# Patient Record
Sex: MALE | Race: WHITE | NOT HISPANIC OR LATINO | URBAN - METROPOLITAN AREA
[De-identification: names, ages, dates, MRNs, and addresses within clinical notes are randomized per-mention and may not be internally consistent; named-entity substitution may affect disease eponyms.]

---

## 2023-09-14 PROBLEM — E78.5 HYPERLIPIDEMIA: Status: ACTIVE | Noted: 2023-09-14

## 2023-09-14 PROBLEM — H93.19 TINNITUS: Status: ACTIVE | Noted: 2023-09-14

## 2023-09-14 PROBLEM — E66.9 OBESITY (BMI 30.0-34.9): Status: ACTIVE | Noted: 2023-09-14

## 2023-09-14 PROBLEM — I48.0 PAROXYSMAL ATRIAL FIBRILLATION WITH RVR (MULTI): Status: ACTIVE | Noted: 2023-09-14

## 2023-09-14 PROBLEM — B02.29 POSTHERPETIC NEURALGIA: Status: ACTIVE | Noted: 2023-09-14

## 2023-09-14 PROBLEM — I49.9 VENTRICULAR ARRHYTHMIA: Status: ACTIVE | Noted: 2023-09-14

## 2023-09-14 PROBLEM — H81.09 MENIERE DISEASE: Status: ACTIVE | Noted: 2023-09-14

## 2023-09-14 PROBLEM — E66.811 OBESITY (BMI 30.0-34.9): Status: ACTIVE | Noted: 2023-09-14

## 2023-09-14 PROBLEM — E55.9 VITAMIN D DEFICIENCY: Status: ACTIVE | Noted: 2023-09-14

## 2023-09-14 RX ORDER — TAMSULOSIN HYDROCHLORIDE 0.4 MG/1
1 CAPSULE ORAL DAILY
COMMUNITY
Start: 2021-05-18

## 2023-09-14 RX ORDER — SIMVASTATIN 40 MG/1
40 TABLET, FILM COATED ORAL EVERY EVENING
COMMUNITY
Start: 2022-04-19 | End: 2023-10-17 | Stop reason: SDUPTHER

## 2023-09-14 RX ORDER — CHOLECALCIFEROL (VITAMIN D3) 50 MCG
100 TABLET ORAL DAILY
COMMUNITY

## 2023-09-14 RX ORDER — METOPROLOL SUCCINATE 25 MG/1
1 TABLET, EXTENDED RELEASE ORAL 2 TIMES DAILY
COMMUNITY
End: 2023-10-17 | Stop reason: SDUPTHER

## 2023-09-14 RX ORDER — ASPIRIN 81 MG/1
81 TABLET ORAL DAILY
COMMUNITY

## 2023-09-14 RX ORDER — MULTIVIT-MIN/FA/LYCOPEN/LUTEIN .4-300-25
1 TABLET ORAL DAILY
COMMUNITY

## 2023-10-17 ENCOUNTER — OFFICE VISIT (OUTPATIENT)
Dept: PRIMARY CARE | Facility: CLINIC | Age: 58
End: 2023-10-17
Payer: COMMERCIAL

## 2023-10-17 VITALS
HEIGHT: 73 IN | SYSTOLIC BLOOD PRESSURE: 128 MMHG | BODY MASS INDEX: 33.66 KG/M2 | TEMPERATURE: 98.2 F | DIASTOLIC BLOOD PRESSURE: 80 MMHG | WEIGHT: 254 LBS | HEART RATE: 65 BPM

## 2023-10-17 DIAGNOSIS — Z12.5 SCREENING PSA (PROSTATE SPECIFIC ANTIGEN): ICD-10-CM

## 2023-10-17 DIAGNOSIS — E55.9 VITAMIN D DEFICIENCY: ICD-10-CM

## 2023-10-17 DIAGNOSIS — I48.0 PAF (PAROXYSMAL ATRIAL FIBRILLATION) (MULTI): ICD-10-CM

## 2023-10-17 DIAGNOSIS — E78.5 HYPERLIPIDEMIA, UNSPECIFIED HYPERLIPIDEMIA TYPE: Primary | ICD-10-CM

## 2023-10-17 DIAGNOSIS — N40.0 BENIGN PROSTATIC HYPERPLASIA WITHOUT LOWER URINARY TRACT SYMPTOMS: ICD-10-CM

## 2023-10-17 PROCEDURE — 99214 OFFICE O/P EST MOD 30 MIN: CPT | Performed by: INTERNAL MEDICINE

## 2023-10-17 PROCEDURE — 3008F BODY MASS INDEX DOCD: CPT | Performed by: INTERNAL MEDICINE

## 2023-10-17 PROCEDURE — 1036F TOBACCO NON-USER: CPT | Performed by: INTERNAL MEDICINE

## 2023-10-17 RX ORDER — SIMVASTATIN 40 MG/1
40 TABLET, FILM COATED ORAL EVERY EVENING
Qty: 90 TABLET | Refills: 1 | Status: SHIPPED | OUTPATIENT
Start: 2023-10-17 | End: 2024-04-15 | Stop reason: SDUPTHER

## 2023-10-17 RX ORDER — METOPROLOL SUCCINATE 25 MG/1
25 TABLET, EXTENDED RELEASE ORAL 2 TIMES DAILY
Qty: 180 TABLET | Refills: 1 | Status: SHIPPED | OUTPATIENT
Start: 2023-10-17 | End: 2024-04-15 | Stop reason: SDUPTHER

## 2023-10-17 ASSESSMENT — PATIENT HEALTH QUESTIONNAIRE - PHQ9
1. LITTLE INTEREST OR PLEASURE IN DOING THINGS: NOT AT ALL
SUM OF ALL RESPONSES TO PHQ9 QUESTIONS 1 & 2: 0
2. FEELING DOWN, DEPRESSED OR HOPELESS: NOT AT ALL

## 2023-10-17 ASSESSMENT — LIFESTYLE VARIABLES
HOW OFTEN DO YOU HAVE A DRINK CONTAINING ALCOHOL: NEVER
SKIP TO QUESTIONS 9-10: 1
AUDIT-C TOTAL SCORE: 0
HOW OFTEN DO YOU HAVE SIX OR MORE DRINKS ON ONE OCCASION: NEVER
HOW MANY STANDARD DRINKS CONTAINING ALCOHOL DO YOU HAVE ON A TYPICAL DAY: PATIENT DOES NOT DRINK

## 2023-10-17 ASSESSMENT — ENCOUNTER SYMPTOMS
DEPRESSION: 0
OCCASIONAL FEELINGS OF UNSTEADINESS: 0
LOSS OF SENSATION IN FEET: 0

## 2023-10-17 NOTE — PROGRESS NOTES
Patient is here For follow-up of his medical problems including hyperlipidemia, vitamin D deficiency, paroxysmal atrial fibrillation, obesity and other medical problems.  He also needs a refill on his prescriptions.  He denies any fever chill anorexia.  Has no headache or visual disturbances.  Has no chest pain or palpitation.  Has no respiratory symptoms.  Denies any nausea matting pain abdomen.  He has no urinary symptoms.  He is being followed by his urologist Dr. Guajardo for his prostate enlargement.  Denies any weakness of any extremity.  He gets occasional backache.  Review of other systems are negative.    On examination:  General examination: Overweight.  His BMI is 33.5 today  Eyes: There is no pallor jaundice pupils are equal and reactive to light  Oral cavity throat normal  Neck: There is no thyroid alignment, JVD or carotid bruit  Lungs: Clear to auscultation  CVS: Heart sounds are regular there is no gallop murmur  Abdomen: There is no megaly tenderness  CNS: Power is normal  Musculoskeletal system there is no joint swelling or deformity  Legs: No edema  Skin: No rash    Assessment and plan:  1.  Hyperlipidemia: Prescription for simvastatin is renewed.  Repeat lipid panel is ordered  2.  Vitamin D deficiency: Advised to continue with the current supplement.  Repeat vitamin D level is ordered  3.  Paroxysmal atrial fibrillation: Patient is clinically in sinus rhythm.  His prescription for metoprolol is renewed.  Patient is advised to make a follow-up appointment with his cardiologist .  4.  Benign prostatic hypertrophy: Patient has no urinary symptoms.  He is on Flomax  5.  Obesity: Educated about cardiovascular risk factors.  Encouraged to cut down calorie intake and exercise regularly  6.  Health maintenance: Patient had a Cologuard test done in January 2021.  We will have a repeat Cologuard test done next year.  His PSA is being followed by his urologist Dr. Guajardo.  Patient declined a flu  vaccination today.  Recommendations regarding booster dose for COVID-19 vaccine is made to the patient  He will be seen the office in 6 months, earlier if needed

## 2024-03-22 PROBLEM — S62.609A FINGER FRACTURE: Status: ACTIVE | Noted: 2023-08-18

## 2024-03-22 PROBLEM — S61.219A FINGER LACERATION: Status: ACTIVE | Noted: 2023-08-18

## 2024-04-15 ENCOUNTER — OFFICE VISIT (OUTPATIENT)
Dept: PRIMARY CARE | Facility: CLINIC | Age: 59
End: 2024-04-15
Payer: COMMERCIAL

## 2024-04-15 VITALS
BODY MASS INDEX: 34.19 KG/M2 | HEIGHT: 73 IN | SYSTOLIC BLOOD PRESSURE: 105 MMHG | OXYGEN SATURATION: 95 % | DIASTOLIC BLOOD PRESSURE: 71 MMHG | WEIGHT: 258 LBS | HEART RATE: 73 BPM

## 2024-04-15 DIAGNOSIS — I48.0 PAF (PAROXYSMAL ATRIAL FIBRILLATION) (MULTI): ICD-10-CM

## 2024-04-15 DIAGNOSIS — E55.9 VITAMIN D DEFICIENCY: ICD-10-CM

## 2024-04-15 DIAGNOSIS — Z12.11 ENCOUNTER FOR SCREENING FOR MALIGNANT NEOPLASM OF COLON: ICD-10-CM

## 2024-04-15 DIAGNOSIS — E78.5 HYPERLIPIDEMIA, UNSPECIFIED HYPERLIPIDEMIA TYPE: Primary | ICD-10-CM

## 2024-04-15 DIAGNOSIS — N40.0 BENIGN PROSTATIC HYPERPLASIA WITHOUT LOWER URINARY TRACT SYMPTOMS: ICD-10-CM

## 2024-04-15 PROCEDURE — 1036F TOBACCO NON-USER: CPT | Performed by: INTERNAL MEDICINE

## 2024-04-15 PROCEDURE — 99214 OFFICE O/P EST MOD 30 MIN: CPT | Performed by: INTERNAL MEDICINE

## 2024-04-15 PROCEDURE — 3008F BODY MASS INDEX DOCD: CPT | Performed by: INTERNAL MEDICINE

## 2024-04-15 RX ORDER — METOPROLOL SUCCINATE 25 MG/1
25 TABLET, EXTENDED RELEASE ORAL 2 TIMES DAILY
Qty: 180 TABLET | Refills: 1 | Status: SHIPPED | OUTPATIENT
Start: 2024-04-15

## 2024-04-15 RX ORDER — SIMVASTATIN 40 MG/1
40 TABLET, FILM COATED ORAL EVERY EVENING
Qty: 90 TABLET | Refills: 1 | Status: SHIPPED | OUTPATIENT
Start: 2024-04-15

## 2024-04-15 ASSESSMENT — PATIENT HEALTH QUESTIONNAIRE - PHQ9
2. FEELING DOWN, DEPRESSED OR HOPELESS: NOT AT ALL
1. LITTLE INTEREST OR PLEASURE IN DOING THINGS: NOT AT ALL
SUM OF ALL RESPONSES TO PHQ9 QUESTIONS 1 & 2: 0

## 2024-04-15 ASSESSMENT — ENCOUNTER SYMPTOMS
OCCASIONAL FEELINGS OF UNSTEADINESS: 0
LOSS OF SENSATION IN FEET: 0
DEPRESSION: 0

## 2024-05-03 LAB — NONINV COLON CA DNA+OCC BLD SCRN STL QL: NEGATIVE

## 2024-05-06 ENCOUNTER — TELEPHONE (OUTPATIENT)
Dept: PRIMARY CARE | Facility: CLINIC | Age: 59
End: 2024-05-06
Payer: COMMERCIAL

## 2024-05-06 NOTE — TELEPHONE ENCOUNTER
Left pt a message     ----- Message from Jyotsna Persaud MD sent at 5/6/2024 11:55 AM EDT -----  Negative Cologuard test.  Recommend to repeat in 3 years  ----- Message -----  From: Stephanie Brito Results In  Sent: 5/3/2024   6:50 PM EDT  To: Jyotsna Persaud MD

## 2024-05-13 ENCOUNTER — OFFICE VISIT (OUTPATIENT)
Dept: CARDIOLOGY | Facility: CLINIC | Age: 59
End: 2024-05-13
Payer: COMMERCIAL

## 2024-05-13 VITALS
DIASTOLIC BLOOD PRESSURE: 78 MMHG | HEART RATE: 81 BPM | SYSTOLIC BLOOD PRESSURE: 118 MMHG | HEIGHT: 73 IN | TEMPERATURE: 96.5 F | WEIGHT: 252 LBS | BODY MASS INDEX: 33.4 KG/M2

## 2024-05-13 DIAGNOSIS — I48.0 PAROXYSMAL ATRIAL FIBRILLATION WITH RVR (MULTI): Primary | ICD-10-CM

## 2024-05-13 DIAGNOSIS — E78.2 MIXED HYPERLIPIDEMIA: ICD-10-CM

## 2024-05-13 DIAGNOSIS — E66.9 OBESITY (BMI 30.0-34.9): ICD-10-CM

## 2024-05-13 PROCEDURE — 99214 OFFICE O/P EST MOD 30 MIN: CPT | Performed by: INTERNAL MEDICINE

## 2024-05-13 PROCEDURE — 1036F TOBACCO NON-USER: CPT | Performed by: INTERNAL MEDICINE

## 2024-05-13 PROCEDURE — 3008F BODY MASS INDEX DOCD: CPT | Performed by: INTERNAL MEDICINE

## 2024-05-13 ASSESSMENT — PAIN SCALES - GENERAL: PAINLEVEL: 0-NO PAIN

## 2024-05-13 NOTE — PROGRESS NOTES
1. Atrial fibrillation, paroxysmal [id number]  2. Hyperlipidemia  3. Overweight    Patient is a pleasant 59-year-old male with the above-noted pertinent past medical history who presents today for follow-up, he states since the last visit he has discontinued his alcohol intake, he has noted significant reduction in his heart rhythm irregularities, skipping beats, he also states that he has increase his level of activity and as a result has now lost 2 pounds, he has no complaints of orthopnea PND palpitation or syncopal episodes.    BMI of 33.25, blood pressure 118/78 mmHg and heart rate of 81 beats per minute patient is a well-developed well-nourished male in no acute distress speaking full sentences no carotid bruits upstroke and volumes are normal, heart rate and rhythm are regular S1 and S2 are normal no gallop or murmurs appreciated lungs are clear to auscultation and normal air entry abdomen is obese positive bowel sounds soft and nontender    4/28/2024  Total cholesterol 141, triglyceride 107, HDL 42, and LDL of 80  Sodium 142, potassium 4.3, BUN 15, creatinine 0.81, blood glucose level of 88  Hemoglobin of 16, hematocrit of 48    Paroxysmal atrial fibrillation patient with [id number] score of 0, for further evaluation he 48 hours of Holter monitor that revealed no significant ventricular ectopy or atrial fibrillation, will continue with clinical follow-up  Blood pressure under good control  Hyperlipidemia his latest cholesterol profile was reviewed and discussed with him  Overweight heart healthy diet and weight loss and activity with a goal of 150 minutes of aerobic activity per week was discussed and details provided  Return to my clinic in 1 year.

## 2024-07-02 ENCOUNTER — OFFICE VISIT (OUTPATIENT)
Dept: PRIMARY CARE | Facility: CLINIC | Age: 59
End: 2024-07-02
Payer: COMMERCIAL

## 2024-07-02 VITALS
SYSTOLIC BLOOD PRESSURE: 108 MMHG | DIASTOLIC BLOOD PRESSURE: 80 MMHG | HEART RATE: 77 BPM | TEMPERATURE: 98 F | WEIGHT: 249 LBS | HEIGHT: 73 IN | BODY MASS INDEX: 33 KG/M2

## 2024-07-02 DIAGNOSIS — E55.9 VITAMIN D DEFICIENCY: ICD-10-CM

## 2024-07-02 DIAGNOSIS — M54.50 ACUTE MIDLINE LOW BACK PAIN WITHOUT SCIATICA: Primary | ICD-10-CM

## 2024-07-02 DIAGNOSIS — I48.0 PAF (PAROXYSMAL ATRIAL FIBRILLATION) (MULTI): ICD-10-CM

## 2024-07-02 PROCEDURE — 1036F TOBACCO NON-USER: CPT | Performed by: INTERNAL MEDICINE

## 2024-07-02 PROCEDURE — 3008F BODY MASS INDEX DOCD: CPT | Performed by: INTERNAL MEDICINE

## 2024-07-02 PROCEDURE — 99213 OFFICE O/P EST LOW 20 MIN: CPT | Performed by: INTERNAL MEDICINE

## 2024-07-02 RX ORDER — CYCLOBENZAPRINE HCL 5 MG
5 TABLET ORAL 3 TIMES DAILY PRN
Qty: 30 TABLET | Refills: 0 | Status: SHIPPED | OUTPATIENT
Start: 2024-07-02 | End: 2024-08-31

## 2024-07-02 NOTE — PROGRESS NOTES
Internal Medicine Outpatient Visit  Chief Complaint   Patient presents with    Back Pain     Lower x two weeks ago. But started hurting real bad last Tuesday and he went to ER. They said he had muscle spasm       HPI: Sundar Sood is of 59 y.o. who is here for Internal Visit for the following issues:  Seen in office today with chief complaint of lower back pain for about 8 to 10 days.  Patient went to the emergency room.  He had a CT scan done which were negative for any acute abnormality.  I do not have the result of blood test done in the hospital.  He was told that he has muscle spasms.  He was prescribed cyclobenzaprine and some pain medications.  Symptoms are getting better but not completely resolved.  He denies any weakness of the lower extremity.  He has no bladder and bowel disturbances.  Has no fever or chills.  Has no urinary symptoms.  Review of other systems are negative.    Past Surgical History:   Procedure Laterality Date    FINGER SURGERY Left     left  index finger    OTHER SURGICAL HISTORY  10/25/2021    Foot surgery       Family History   Problem Relation Name Age of Onset    Atrial fibrillation Mother      Hypertension Mother      Other (stroke syndrome) Mother      Atrial fibrillation Father      Lung cancer Father      Atrial fibrillation Sister      Atrial fibrillation Brother      Dementia Maternal Grandmother           Current Outpatient Medications on File Prior to Visit   Medication Sig Dispense Refill    aspirin 81 mg EC tablet Take 1 tablet (81 mg) by mouth once daily.      cholecalciferol (Vitamin D-3) 50 MCG (2000 UT) tablet Take 2 tablets (100 mcg) by mouth once daily.      folic acid/multivit-min/lutein (CENTRUM SILVER ORAL) Take 1 tablet by mouth once daily.      metoprolol succinate XL (Toprol-XL) 25 mg 24 hr tablet Take 1 tablet (25 mg) by mouth 2 times a day. 180 tablet 1    multivitamin with minerals iron-free (Centrum Silver) Take 1 tablet by mouth once daily.       "simvastatin (Zocor) 40 mg tablet Take 1 tablet (40 mg) by mouth once daily in the evening. 90 tablet 1    tamsulosin (Flomax) 0.4 mg 24 hr capsule Take 1 capsule (0.4 mg) by mouth once daily.       No current facility-administered medications on file prior to visit.       Blood pressure 108/80, pulse 77, temperature 36.7 °C (98 °F), height 1.854 m (6' 1\"), weight 113 kg (249 lb).  Body mass index is 32.85 kg/m².  General examination: Normal  Eyes: There is no pallor or jaundice  Lungs: Clear to auscultation  CVs: Heart sounds are regular there is no gallop murmur  Genitourinary system there is no CVA tenderness  Examination of the back: There is no local deformity or tenderness  Examination of the legs power and sensations are normal    Assessment and plan:    1. Acute midline low back pain without sciatica  According to the patient CT scan was done in the hospital which was negative.  Patient's symptoms are getting better.  He needs more cyclobenzaprine tablets refill is sent to the pharmacy  - cyclobenzaprine (Flexeril) 5 mg tablet; Take 1 tablet (5 mg) by mouth 3 times a day as needed for muscle spasms.  Dispense: 30 tablet; Refill: 0    2. Vitamin D deficiency  Advised to continue with the current supplements.    3. PAF (paroxysmal atrial fibrillation) (Multi)  Patient seems to be in sinus rhythm.  He is on metoprolol.  He is being followed by his cardiologist.  He is advised to call our office if symptoms are not resolved in the next week or so or he has any other new symptoms.                   "

## 2024-10-14 ENCOUNTER — APPOINTMENT (OUTPATIENT)
Dept: PRIMARY CARE | Facility: CLINIC | Age: 59
End: 2024-10-14
Payer: COMMERCIAL

## 2024-10-14 VITALS
WEIGHT: 252.6 LBS | HEART RATE: 76 BPM | BODY MASS INDEX: 33.48 KG/M2 | HEIGHT: 73 IN | OXYGEN SATURATION: 94 % | SYSTOLIC BLOOD PRESSURE: 122 MMHG | DIASTOLIC BLOOD PRESSURE: 80 MMHG

## 2024-10-14 DIAGNOSIS — E78.5 HYPERLIPIDEMIA, UNSPECIFIED HYPERLIPIDEMIA TYPE: Primary | ICD-10-CM

## 2024-10-14 DIAGNOSIS — I48.0 PAF (PAROXYSMAL ATRIAL FIBRILLATION) (MULTI): ICD-10-CM

## 2024-10-14 DIAGNOSIS — E66.811 OBESITY, CLASS 1: ICD-10-CM

## 2024-10-14 DIAGNOSIS — N40.0 BENIGN PROSTATIC HYPERPLASIA WITHOUT LOWER URINARY TRACT SYMPTOMS: ICD-10-CM

## 2024-10-14 DIAGNOSIS — E55.9 VITAMIN D DEFICIENCY: ICD-10-CM

## 2024-10-14 DIAGNOSIS — M54.50 ACUTE MIDLINE LOW BACK PAIN WITHOUT SCIATICA: ICD-10-CM

## 2024-10-14 PROBLEM — M54.9 BACK PAIN: Status: ACTIVE | Noted: 2024-06-25

## 2024-10-14 PROCEDURE — 1036F TOBACCO NON-USER: CPT | Performed by: INTERNAL MEDICINE

## 2024-10-14 PROCEDURE — 3008F BODY MASS INDEX DOCD: CPT | Performed by: INTERNAL MEDICINE

## 2024-10-14 PROCEDURE — 99214 OFFICE O/P EST MOD 30 MIN: CPT | Performed by: INTERNAL MEDICINE

## 2024-10-14 RX ORDER — SIMVASTATIN 40 MG/1
40 TABLET, FILM COATED ORAL EVERY EVENING
Qty: 90 TABLET | Refills: 1 | Status: SHIPPED | OUTPATIENT
Start: 2024-10-14

## 2024-10-14 RX ORDER — METOPROLOL SUCCINATE 25 MG/1
25 TABLET, EXTENDED RELEASE ORAL 2 TIMES DAILY
Qty: 180 TABLET | Refills: 1 | Status: SHIPPED | OUTPATIENT
Start: 2024-10-14

## 2024-10-14 ASSESSMENT — PATIENT HEALTH QUESTIONNAIRE - PHQ9
SUM OF ALL RESPONSES TO PHQ9 QUESTIONS 1 & 2: 0
2. FEELING DOWN, DEPRESSED OR HOPELESS: NOT AT ALL
1. LITTLE INTEREST OR PLEASURE IN DOING THINGS: NOT AT ALL

## 2024-10-14 ASSESSMENT — ENCOUNTER SYMPTOMS
OCCASIONAL FEELINGS OF UNSTEADINESS: 0
LOSS OF SENSATION IN FEET: 0
DEPRESSION: 0

## 2024-10-14 NOTE — PROGRESS NOTES
Internal Medicine Outpatient Visit  Chief Complaint   Patient presents with    Follow-up     6 month follow up, med refill        HPI: Sundar Sood is of 59 y.o. who is here for Internal Visit for the following issues:  Patient is seen office today for follow-up of his medical problems including hyperlipidemia, vitamin D deficiency, obesity, paroxysmal atrial fibrillation and other medical problems.  He needs a refill on some of his prescriptions.  He denies any fever chill Anexsia.  No headache or visual disturbances.  He has no chest pain.  He gets occasional palpitation.  Has no recent change in bowel habit.  He has no weakness of any extremity.  Denies any joint swelling or pain.  He has no cold heat intolerance any symptoms of thyroid dysfunction.  Review of other systems are negative.    Past Surgical History:   Procedure Laterality Date    FINGER SURGERY Left     left  index finger    OTHER SURGICAL HISTORY  10/25/2021    Foot surgery       Family History   Problem Relation Name Age of Onset    Atrial fibrillation Mother      Hypertension Mother      Other (stroke syndrome) Mother      Atrial fibrillation Father      Lung cancer Father      Atrial fibrillation Sister      Atrial fibrillation Brother      Dementia Maternal Grandmother           Current Outpatient Medications on File Prior to Visit   Medication Sig Dispense Refill    aspirin 81 mg EC tablet Take 1 tablet (81 mg) by mouth once daily.      cholecalciferol (Vitamin D-3) 50 MCG (2000 UT) tablet Take 2 tablets (100 mcg) by mouth once daily.      folic acid/multivit-min/lutein (CENTRUM SILVER ORAL) Take 1 tablet by mouth once daily.      multivitamin with minerals iron-free (Centrum Silver) Take 1 tablet by mouth once daily.      tamsulosin (Flomax) 0.4 mg 24 hr capsule Take 1 capsule (0.4 mg) by mouth once daily.      [DISCONTINUED] metoprolol succinate XL (Toprol-XL) 25 mg 24 hr tablet Take 1 tablet (25 mg) by mouth 2 times a day. 180 tablet 1  "   [DISCONTINUED] simvastatin (Zocor) 40 mg tablet Take 1 tablet (40 mg) by mouth once daily in the evening. 90 tablet 1     No current facility-administered medications on file prior to visit.       Blood pressure 122/80, pulse 76, height 1.854 m (6' 1\"), weight 115 kg (252 lb 9.6 oz), SpO2 94%.  Body mass index is 33.33 kg/m².  Renal examination: Overweight  Eyes: There is no pallor or jaundice pupils are equal and reactive to light  Oral cavity throat normal  Neck: There is no carotid bruit or JVD  Lungs: Breath sounds are normal  CVS: Rhythm is regular there is no gallop murmur  Abdomen: There is no organomegaly tenderness  CNS: Normal power  Musculoskeletal system there is no joint swelling or deformity  Legs: No edema  Skin: No rash    Assessment and plan:    1. Hyperlipidemia, unspecified hyperlipidemia type  Prescription for simvastatin is renewed.  Repeat lipid panel is ordered  - simvastatin (Zocor) 40 mg tablet; Take 1 tablet (40 mg) by mouth once daily in the evening.  Dispense: 90 tablet; Refill: 1  - Comprehensive metabolic panel; Future  - CBC and Auto Differential; Future  - Lipid panel; Future  - Vitamin D 25-Hydroxy,Total (for eval of Vitamin D levels); Future    2. Acute midline low back pain without sciatica  Improved.  - Comprehensive metabolic panel; Future  - CBC and Auto Differential; Future  - Lipid panel; Future  - Vitamin D 25-Hydroxy,Total (for eval of Vitamin D levels); Future    3. Vitamin D deficiency  Continued on the current supplement.  Repeat vitamin D level is ordered  - Comprehensive metabolic panel; Future  - CBC and Auto Differential; Future  - Lipid panel; Future  - Vitamin D 25-Hydroxy,Total (for eval of Vitamin D levels); Future    4. PAF (paroxysmal atrial fibrillation) (Multi)  Clinically in sinus rhythm.  Prescription for metoprolol is renewed  - metoprolol succinate XL (Toprol-XL) 25 mg 24 hr tablet; Take 1 tablet (25 mg) by mouth 2 times a day.  Dispense: 180 tablet; " Refill: 1  - Comprehensive metabolic panel; Future  - CBC and Auto Differential; Future  - Lipid panel; Future  - Vitamin D 25-Hydroxy,Total (for eval of Vitamin D levels); Future    5. Benign prostatic hyperplasia without lower urinary tract symptoms  Patient is being followed by his urologist.  - Comprehensive metabolic panel; Future  - CBC and Auto Differential; Future  - Lipid panel; Future  - Vitamin D 25-Hydroxy,Total (for eval of Vitamin D levels); Future    6.  Health maintenance /obesity, class 1  Diet excise activity reviewed with the patient.  He had a Cologuard test done in April of this year.  His PSA is being followed by his urologist.  Patient declined flu vaccination in office today.  - Comprehensive metabolic panel; Future  - CBC and Auto Differential; Future  - Lipid panel; Future  - Vitamin D 25-Hydroxy,Total (for eval of Vitamin D levels); Future    Patient will be seen in office in 3 to 4 months.

## 2024-10-21 ENCOUNTER — TELEPHONE (OUTPATIENT)
Dept: PRIMARY CARE | Facility: CLINIC | Age: 59
End: 2024-10-21
Payer: COMMERCIAL

## 2024-10-21 NOTE — TELEPHONE ENCOUNTER
Left a message for patient about results     ----- Message from Jyotsna Persaud sent at 10/21/2024 11:28 AM EDT -----  Acceptable results  ----- Message -----  From: Interface, Onbase - Scan In  Sent: 10/21/2024  10:12 AM EDT  To: Jyotsna Persaud MD

## 2025-01-17 PROBLEM — S61.012A LACERATION OF LEFT THUMB: Status: ACTIVE | Noted: 2025-01-17

## 2025-02-10 ENCOUNTER — APPOINTMENT (OUTPATIENT)
Dept: PRIMARY CARE | Facility: CLINIC | Age: 60
End: 2025-02-10
Payer: COMMERCIAL

## 2025-02-10 VITALS
BODY MASS INDEX: 34.11 KG/M2 | HEART RATE: 82 BPM | WEIGHT: 257.4 LBS | HEIGHT: 73 IN | OXYGEN SATURATION: 92 % | DIASTOLIC BLOOD PRESSURE: 80 MMHG | SYSTOLIC BLOOD PRESSURE: 110 MMHG

## 2025-02-10 DIAGNOSIS — N40.0 BENIGN PROSTATIC HYPERPLASIA WITHOUT LOWER URINARY TRACT SYMPTOMS: ICD-10-CM

## 2025-02-10 DIAGNOSIS — E66.811 OBESITY, CLASS 1: ICD-10-CM

## 2025-02-10 DIAGNOSIS — I48.0 PAF (PAROXYSMAL ATRIAL FIBRILLATION) (MULTI): ICD-10-CM

## 2025-02-10 DIAGNOSIS — J40 BRONCHITIS: Primary | ICD-10-CM

## 2025-02-10 DIAGNOSIS — E78.5 HYPERLIPIDEMIA, UNSPECIFIED HYPERLIPIDEMIA TYPE: ICD-10-CM

## 2025-02-10 DIAGNOSIS — E55.9 VITAMIN D DEFICIENCY: ICD-10-CM

## 2025-02-10 DIAGNOSIS — M54.50 ACUTE MIDLINE LOW BACK PAIN WITHOUT SCIATICA: ICD-10-CM

## 2025-02-10 PROCEDURE — 1036F TOBACCO NON-USER: CPT | Performed by: INTERNAL MEDICINE

## 2025-02-10 PROCEDURE — 99214 OFFICE O/P EST MOD 30 MIN: CPT | Performed by: INTERNAL MEDICINE

## 2025-02-10 PROCEDURE — 3008F BODY MASS INDEX DOCD: CPT | Performed by: INTERNAL MEDICINE

## 2025-02-10 RX ORDER — METOPROLOL SUCCINATE 25 MG/1
25 TABLET, EXTENDED RELEASE ORAL 2 TIMES DAILY
Qty: 180 TABLET | Refills: 1 | Status: SHIPPED | OUTPATIENT
Start: 2025-02-10

## 2025-02-10 RX ORDER — DOXYCYCLINE 100 MG/1
100 CAPSULE ORAL 2 TIMES DAILY
COMMUNITY
Start: 2025-02-07 | End: 2025-02-17

## 2025-02-10 RX ORDER — ALBUTEROL SULFATE 90 UG/1
2 INHALANT RESPIRATORY (INHALATION) EVERY 4 HOURS PRN
COMMUNITY
Start: 2025-02-07

## 2025-02-10 RX ORDER — SIMVASTATIN 40 MG/1
40 TABLET, FILM COATED ORAL EVERY EVENING
Qty: 90 TABLET | Refills: 1 | Status: SHIPPED | OUTPATIENT
Start: 2025-02-10

## 2025-02-10 ASSESSMENT — ENCOUNTER SYMPTOMS
OCCASIONAL FEELINGS OF UNSTEADINESS: 0
DEPRESSION: 0
LOSS OF SENSATION IN FEET: 0

## 2025-02-10 NOTE — PROGRESS NOTES
Internal Medicine Outpatient Visit  Chief Complaint   Patient presents with    Follow-up     4 to 6 month follow up        HPI: Sundar Sood is of 59 y.o. who is here for Internal Visit for the following issues:  Patient seen office today for follow-up of his medical problems including hypertension, hyperlipidemia, vitamin D deficiency, obesity and other medical problems.  He also needs a refill on his prescriptions.  Denies any fever chill Anexsia.  Has a recent cough and some wheezing for he went to the urgent care was diagnosed to have bronchitis patient was given antibiotic.  Chest x-ray was negative for pneumonia.  He has no nausea vomiting pain abdomen.  He has no weakness of any extremity.  Denies any joint swelling or pain.  Review of other systems are negative.    Past Surgical History:   Procedure Laterality Date    FINGER SURGERY Left     left  index finger    OTHER SURGICAL HISTORY  10/25/2021    Foot surgery       Family History   Problem Relation Name Age of Onset    Atrial fibrillation Mother      Hypertension Mother      Other (stroke syndrome) Mother      Atrial fibrillation Father      Lung cancer Father      Atrial fibrillation Sister      Atrial fibrillation Brother      Dementia Maternal Grandmother           Current Outpatient Medications on File Prior to Visit   Medication Sig Dispense Refill    albuterol 90 mcg/actuation inhaler Inhale 2 puffs every 4 hours if needed.      aspirin 81 mg EC tablet Take 1 tablet (81 mg) by mouth once daily.      cholecalciferol (Vitamin D-3) 50 MCG (2000 UT) tablet Take 2 tablets (100 mcg) by mouth once daily.      doxycycline (Monodox) 100 mg capsule Take 1 capsule (100 mg) by mouth twice a day.      multivitamin with minerals iron-free (Centrum Silver) Take 1 tablet by mouth once daily.      tamsulosin (Flomax) 0.4 mg 24 hr capsule Take 1 capsule (0.4 mg) by mouth once daily.      [DISCONTINUED] metoprolol succinate XL (Toprol-XL) 25 mg 24 hr tablet Take  "1 tablet (25 mg) by mouth 2 times a day. 180 tablet 1    [DISCONTINUED] simvastatin (Zocor) 40 mg tablet Take 1 tablet (40 mg) by mouth once daily in the evening. 90 tablet 1    [DISCONTINUED] folic acid/multivit-min/lutein (CENTRUM SILVER ORAL) Take 1 tablet by mouth once daily. (Patient not taking: Reported on 2/10/2025)       No current facility-administered medications on file prior to visit.       Blood pressure 110/80, pulse 82, height 1.854 m (6' 1\"), weight 117 kg (257 lb 6.4 oz), SpO2 92%.  Body mass index is 33.96 kg/m².  General examination: Overweight  Eyes: There is no pallor or jaundice  Neck: There is no JVD or carotid bruit  Lungs: Bilateral occasional rhonchi is present  CVS: Rhythm is regular there is no gallop murmur  Abdomen: Soft there is no tenderness  CNS: Normal power  Legs: No edema  Skin: No rash    Assessment and plan:    1. Bronchitis (Primary)  Advised to complete the course of doxycycline.  Patient is taking albuterol inhaler as needed.    2. Hyperlipidemia, unspecified hyperlipidemia type  Prescription for simvastatin is renewed.  Repeat lipid panel is ordered.  - simvastatin (Zocor) 40 mg tablet; Take 1 tablet (40 mg) by mouth once daily in the evening.  Dispense: 90 tablet; Refill: 1    3. PAF (paroxysmal atrial fibrillation) (Multi)  Patient is on metoprolol.  He has a follow-up appointment with his cardiologist  - metoprolol succinate XL (Toprol-XL) 25 mg 24 hr tablet; Take 1 tablet (25 mg) by mouth 2 times a day.  Dispense: 180 tablet; Refill: 1    4. Acute midline low back pain without sciatica  There is no recent exacerbation.    5.  Health maintenance /obesity, class 1  Encouraged to exercise regularly and cut down his calorie intake.  He is being followed by his urologist for prostate health.  He has a Cologuard test done in April of last year.  He declined flu vaccination.    6. Vitamin D deficiency  Last vitamin D level was 42    7. Benign prostatic hyperplasia without lower " urinary tract symptoms  He has no urinary symptoms.  He has a follow-up appointment with his urologist.  He will be seen in office in 4 months.

## 2025-03-02 LAB
25(OH)D3+25(OH)D2 SERPL-MCNC: 50 NG/ML (ref 30–100)
ALBUMIN SERPL-MCNC: 4.3 G/DL (ref 3.6–5.1)
ALP SERPL-CCNC: 48 U/L (ref 35–144)
ALT SERPL-CCNC: 25 U/L (ref 9–46)
ANION GAP SERPL CALCULATED.4IONS-SCNC: 8 MMOL/L (CALC) (ref 7–17)
AST SERPL-CCNC: 21 U/L (ref 10–35)
BASOPHILS # BLD AUTO: 41 CELLS/UL (ref 0–200)
BASOPHILS NFR BLD AUTO: 0.7 %
BILIRUB SERPL-MCNC: 1 MG/DL (ref 0.2–1.2)
BUN SERPL-MCNC: 15 MG/DL (ref 7–25)
CALCIUM SERPL-MCNC: 9.1 MG/DL (ref 8.6–10.3)
CHLORIDE SERPL-SCNC: 106 MMOL/L (ref 98–110)
CHOLEST SERPL-MCNC: 137 MG/DL
CHOLEST/HDLC SERPL: 3.3 (CALC)
CO2 SERPL-SCNC: 27 MMOL/L (ref 20–32)
CREAT SERPL-MCNC: 0.87 MG/DL (ref 0.7–1.35)
EGFRCR SERPLBLD CKD-EPI 2021: 98 ML/MIN/1.73M2
EOSINOPHIL # BLD AUTO: 112 CELLS/UL (ref 15–500)
EOSINOPHIL NFR BLD AUTO: 1.9 %
ERYTHROCYTE [DISTWIDTH] IN BLOOD BY AUTOMATED COUNT: 11.9 % (ref 11–15)
GLUCOSE SERPL-MCNC: 89 MG/DL (ref 65–99)
HCT VFR BLD AUTO: 47.3 % (ref 38.5–50)
HDLC SERPL-MCNC: 41 MG/DL
HGB BLD-MCNC: 15.2 G/DL (ref 13.2–17.1)
LDLC SERPL CALC-MCNC: 76 MG/DL (CALC)
LYMPHOCYTES # BLD AUTO: 1640 CELLS/UL (ref 850–3900)
LYMPHOCYTES NFR BLD AUTO: 27.8 %
MCH RBC QN AUTO: 29.1 PG (ref 27–33)
MCHC RBC AUTO-ENTMCNC: 32.1 G/DL (ref 32–36)
MCV RBC AUTO: 90.4 FL (ref 80–100)
MONOCYTES # BLD AUTO: 543 CELLS/UL (ref 200–950)
MONOCYTES NFR BLD AUTO: 9.2 %
NEUTROPHILS # BLD AUTO: 3564 CELLS/UL (ref 1500–7800)
NEUTROPHILS NFR BLD AUTO: 60.4 %
NONHDLC SERPL-MCNC: 96 MG/DL (CALC)
PLATELET # BLD AUTO: 157 THOUSAND/UL (ref 140–400)
PMV BLD REES-ECKER: 11.2 FL (ref 7.5–12.5)
POTASSIUM SERPL-SCNC: 4 MMOL/L (ref 3.5–5.3)
PROT SERPL-MCNC: 6.9 G/DL (ref 6.1–8.1)
RBC # BLD AUTO: 5.23 MILLION/UL (ref 4.2–5.8)
SODIUM SERPL-SCNC: 141 MMOL/L (ref 135–146)
TRIGL SERPL-MCNC: 116 MG/DL
WBC # BLD AUTO: 5.9 THOUSAND/UL (ref 3.8–10.8)

## 2025-03-03 ENCOUNTER — TELEPHONE (OUTPATIENT)
Dept: PRIMARY CARE | Facility: CLINIC | Age: 60
End: 2025-03-03
Payer: COMMERCIAL

## 2025-03-03 NOTE — TELEPHONE ENCOUNTER
Patient is aware of results.   ----- Message from Jyotsna Persaud sent at 3/3/2025 11:04 AM EST -----  Results are okay  ----- Message -----  From: [facility] Results In  Sent: 3/2/2025   5:49 AM EST  To: Jyotsna Persaud MD

## 2025-05-12 ENCOUNTER — APPOINTMENT (OUTPATIENT)
Dept: CARDIOLOGY | Facility: CLINIC | Age: 60
End: 2025-05-12
Payer: COMMERCIAL

## 2025-05-15 ENCOUNTER — APPOINTMENT (OUTPATIENT)
Dept: CARDIOLOGY | Facility: CLINIC | Age: 60
End: 2025-05-15
Payer: COMMERCIAL

## 2025-05-15 VITALS
BODY MASS INDEX: 33.9 KG/M2 | HEIGHT: 73 IN | HEART RATE: 71 BPM | SYSTOLIC BLOOD PRESSURE: 130 MMHG | TEMPERATURE: 98.2 F | WEIGHT: 255.8 LBS | DIASTOLIC BLOOD PRESSURE: 84 MMHG

## 2025-05-15 DIAGNOSIS — E78.2 MIXED HYPERLIPIDEMIA: ICD-10-CM

## 2025-05-15 DIAGNOSIS — I47.10 SUPRAVENTRICULAR TACHYCARDIA, UNSPECIFIED: ICD-10-CM

## 2025-05-15 DIAGNOSIS — R00.2 PALPITATIONS: ICD-10-CM

## 2025-05-15 DIAGNOSIS — I48.0 PAROXYSMAL ATRIAL FIBRILLATION WITH RVR (MULTI): Primary | ICD-10-CM

## 2025-05-15 PROCEDURE — 1036F TOBACCO NON-USER: CPT | Performed by: INTERNAL MEDICINE

## 2025-05-15 PROCEDURE — 3008F BODY MASS INDEX DOCD: CPT | Performed by: INTERNAL MEDICINE

## 2025-05-15 PROCEDURE — 99213 OFFICE O/P EST LOW 20 MIN: CPT | Performed by: INTERNAL MEDICINE

## 2025-05-15 PROCEDURE — 93000 ELECTROCARDIOGRAM COMPLETE: CPT | Performed by: INTERNAL MEDICINE

## 2025-05-15 ASSESSMENT — ENCOUNTER SYMPTOMS
CONSTITUTIONAL NEGATIVE: 1
SHORTNESS OF BREATH: 0
HEMATURIA: 0
LIGHT-HEADEDNESS: 0
PSYCHIATRIC NEGATIVE: 1
DIZZINESS: 0
DYSURIA: 0
PALPITATIONS: 1
BACK PAIN: 1
DIFFICULTY URINATING: 1
GASTROINTESTINAL NEGATIVE: 1
FREQUENCY: 1

## 2025-05-15 NOTE — PROGRESS NOTES
"  Patient:  Sundar Sood  YOB: 1965  MRN: [id number]       Chief Complaint/Active Symptoms:       Sundar Sood is a 60 y.o. male who returns today for cardiac follow-up of paroxysmal atrial fibrillation and hyperlipidemia.    Patient has episode of skipped beats. Has a more sustained episode of \"skipped beats\" several times per month for several seconds, the longest maybe a minute. No other associated symptoms. No syncope or near syncope. The longer episodes he gives a hard cough and it seems to terminate.     No chest pain or discomfort, no shortness of breath, no edema.     Review of Systems   Constitutional: Negative.    HENT: Negative.     Respiratory:  Negative for shortness of breath.    Cardiovascular:  Positive for palpitations. Negative for chest pain and leg swelling.   Gastrointestinal: Negative.    Genitourinary:  Positive for difficulty urinating and frequency. Negative for dysuria and hematuria.   Musculoskeletal:  Positive for back pain.   Neurological:  Negative for dizziness, syncope and light-headedness.   Psychiatric/Behavioral: Negative.     All other systems reviewed and are negative.      Objective:     Vitals:    05/15/25 1502   BP: 130/84   Pulse: 71   Temp: 36.8 °C (98.2 °F)       Vitals:    05/15/25 1502   Weight: 116 kg (255 lb 12.8 oz)       Allergies:     Allergies[1]       Medications:     Current Outpatient Medications   Medication Instructions    albuterol 90 mcg/actuation inhaler 2 puffs, Every 4 hours PRN    aspirin 81 mg, Daily    cholecalciferol (VITAMIN D-3) 100 mcg, Daily    metoprolol succinate XL (TOPROL-XL) 25 mg, oral, 2 times daily    multivitamin with minerals iron-free (Centrum Silver) 1 tablet, Daily    simvastatin (ZOCOR) 40 mg, oral, Every evening    tamsulosin (Flomax) 0.4 mg 24 hr capsule 1 capsule, Daily       Physical Examination:   GENERAL:  Well developed, well nourished, in no acute distress.  HEENT: NC AT, EOMI with anicteric sclera  NECK: " " Supple, no JVD, no bruit.  CHEST:  Symmetric and nontender.  LUNGS:  Clear to auscultation bilaterally, normal respiratory effort.  HEART:  PMI is nondisplaced. The first 5-6 seconds of my cardiac exam patient had a regular tachycardic rhythm of about 150 bpm with sudden offset. Patient had symptoms during the event and states that is what he experiences at home. Thereafter had RRR with normal S1 sallie S2, no S3. No murmur. No edema, no carotid of abominal bruits, normal distal perfusion.   ABDOMEN: Soft, NT, ND without palpable organomegaly or bruits  EXTREMITIES:  Warm with good color, no clubbing or cyanosis.  There is no edema noted.  PERIPHERAL VASCULAR:  Pulses present and equally palpable; 2+ throughout.  MUSCULOSKELETAL: Mild OA changes  NEURO/PSYCH:  Alert and oriented times three with approppriate behavior and responses. Nonfocal motor examination with normal gait and ambulation  Lymph: No significant palpable lymphadenopathy  Skin: no rash or lesions on exposed skin or reported.    Lab:     CBC:   Lab Results   Component Value Date    WBC 5.9 03/01/2025    RBC 5.23 03/01/2025    HGB 15.2 03/01/2025    HCT 47.3 03/01/2025     03/01/2025        CMP:    Lab Results   Component Value Date     03/01/2025    K 4.0 03/01/2025     03/01/2025    CO2 27 03/01/2025    BUN 15 03/01/2025    CREATININE 0.87 03/01/2025    GLUCOSE 89 03/01/2025    CALCIUM 9.1 03/01/2025       Magnesium:    No results found for: \"MG\"    Lipid Profile:    Lab Results   Component Value Date    TRIG 116 03/01/2025    HDL 41 03/01/2025    LDLCALC 76 03/01/2025       TSH:    No results found for: \"TSH\"    BNP:   No results found for: \"BNP\"     PT/INR:    No results found for: \"PROTIME\", \"INR\"    HgBA1c:    No results found for: \"HGBA1C\"    BMP:  Lab Results   Component Value Date     03/01/2025    K 4.0 03/01/2025     03/01/2025    CO2 27 03/01/2025    BUN 15 03/01/2025    CREATININE 0.87 03/01/2025       Cardiac " "Enzymes:    No results found for: \"TROPHS\"    Hepatic Function Panel:    Lab Results   Component Value Date    ALKPHOS 48 03/01/2025    ALT 25 03/01/2025    AST 21 03/01/2025    PROT 6.9 03/01/2025    BILITOT 1.0 03/01/2025         Diagnostic Studies:     EKG:   EKG today ectopic atrial rhythm at 66 bpm, otherwise normal     Radiology:     No orders to display     ASSESSMENT     Problem List Items Addressed This Visit       Hyperlipidemia    Paroxysmal atrial fibrillation with RVR (Multi) - Primary    Relevant Orders    ECG 12 lead (Clinic Performed) (Completed)    Follow Up In Cardiology    Supraventricular tachycardia, unspecified (CMS-HCC)    Palpitations    Relevant Orders    Follow Up In Cardiology       PLAN     1.  Paroxysmal atrial fibrillation.  Supraventricular tachycardia.  Palpitations.  Patient has brief and relatively infrequent episodes of tachycardia.  The fact that these terminate with a cough certainly suggests more of an SVT than atrial fibrillation origin but per the records atrial fibrillation was seen in the past.  For now would recommend him clinically but if they increase in frequency or duration or he has secondary symptoms such as lightheadedness chest pain or shortness of breath would recommend we did perform a monitor and identify for certain what is going on and consider referral to electrophysiology.  2.  Hyperlipidemia.  On medication we encourage diet exercise and weight loss.    As long as his symptoms are very brief lasting a few seconds and infrequent we will see him in follow-up in a year.  If he has any sustained event we recommend he seek emergency medical attention and if they increase in frequency or duration call and let us know and we will request a monitor or an earlier office appointment.                   [1]   Allergies  Allergen Reactions    Penicillins Unknown     "

## 2025-06-16 ENCOUNTER — APPOINTMENT (OUTPATIENT)
Dept: PRIMARY CARE | Facility: CLINIC | Age: 60
End: 2025-06-16
Payer: COMMERCIAL

## 2025-06-16 VITALS
OXYGEN SATURATION: 91 % | BODY MASS INDEX: 33.83 KG/M2 | SYSTOLIC BLOOD PRESSURE: 120 MMHG | HEART RATE: 72 BPM | DIASTOLIC BLOOD PRESSURE: 80 MMHG | HEIGHT: 73 IN | WEIGHT: 255.26 LBS

## 2025-06-16 DIAGNOSIS — I48.0 PAF (PAROXYSMAL ATRIAL FIBRILLATION) (MULTI): ICD-10-CM

## 2025-06-16 DIAGNOSIS — Z00.00 WELLNESS EXAMINATION: Primary | ICD-10-CM

## 2025-06-16 DIAGNOSIS — E66.811 OBESITY, CLASS 1: ICD-10-CM

## 2025-06-16 DIAGNOSIS — E55.9 VITAMIN D DEFICIENCY: ICD-10-CM

## 2025-06-16 DIAGNOSIS — N40.0 BENIGN PROSTATIC HYPERPLASIA WITHOUT LOWER URINARY TRACT SYMPTOMS: ICD-10-CM

## 2025-06-16 DIAGNOSIS — E78.5 HYPERLIPIDEMIA, UNSPECIFIED HYPERLIPIDEMIA TYPE: ICD-10-CM

## 2025-06-16 PROCEDURE — 99396 PREV VISIT EST AGE 40-64: CPT | Performed by: INTERNAL MEDICINE

## 2025-06-16 PROCEDURE — 1036F TOBACCO NON-USER: CPT | Performed by: INTERNAL MEDICINE

## 2025-06-16 PROCEDURE — 3008F BODY MASS INDEX DOCD: CPT | Performed by: INTERNAL MEDICINE

## 2025-06-16 RX ORDER — SIMVASTATIN 40 MG/1
40 TABLET, FILM COATED ORAL EVERY EVENING
Qty: 90 TABLET | Refills: 1 | Status: SHIPPED | OUTPATIENT
Start: 2025-06-16

## 2025-06-16 RX ORDER — METOPROLOL SUCCINATE 25 MG/1
25 TABLET, EXTENDED RELEASE ORAL 2 TIMES DAILY
Qty: 180 TABLET | Refills: 1 | Status: SHIPPED | OUTPATIENT
Start: 2025-06-16

## 2025-06-16 ASSESSMENT — ENCOUNTER SYMPTOMS
DEPRESSION: 0
OCCASIONAL FEELINGS OF UNSTEADINESS: 0
LOSS OF SENSATION IN FEET: 0

## 2025-06-16 ASSESSMENT — PATIENT HEALTH QUESTIONNAIRE - PHQ9
2. FEELING DOWN, DEPRESSED OR HOPELESS: NOT AT ALL
SUM OF ALL RESPONSES TO PHQ9 QUESTIONS 1 & 2: 0
1. LITTLE INTEREST OR PLEASURE IN DOING THINGS: NOT AT ALL

## 2025-06-16 NOTE — PROGRESS NOTES
"Internal Medicine Outpatient Visit  Chief Complaint   Patient presents with    Annual Exam     Physical        HPI: Sundar Sood is of 60 y.o. who is here for Internal Visit for the following issues:  Patient is seen office today for wellness examination and follow-up of his medical problems including hyperlipidemia, hypertension, vitamin D deficiency, obesity and other medical problems.  He denies any fever chill Anexsia.  Has no headache or visual disturbances.  Has no chest pain or palpitation.  Denies any shortness of breath or wheezing.  Has no recent change in bowel habit.  Has no urinary symptoms.  He is being followed by his urologist for his prostate enlargement.  Denies any numbness, tingling or weakness of extremities.  He has no polyuria, polydipsia any other symptom uncontrolled hyperglycemia.  Review of other systems are negative.    Surgical History[1]    Family History[2]      Medications Ordered Prior to Encounter[3]    Blood pressure 120/80, pulse 72, height 1.854 m (6' 1\"), weight 116 kg (255 lb 4.2 oz), SpO2 91%.  Body mass index is 33.68 kg/m².  General examination: BMI is 33.6  Eyes: No pallor or jaundice pupils are equal and reactive to light  Oral cavity throat normal  Neck: There is no carotid bruit or JVD  Lungs: Clear to auscultation  CVS: Rhythm is regular there is no murmur  Abdomen: Soft there is no tenderness  CNS: Normal power  Legs: No edema  Skin: No rash    Assessment and plan:    1. Wellness examination (Primary)  Physical examination is remarkable for obesity.  Educated about cardiovascular risk factors.  Encouraged to exercise regularly.  He had a Cologuard test done in April 2024.  His PSA is being followed by his urologist.  I did offer him to have a pneumonia vaccination in the office.  Patient declined a list of all the vaccination he is due is given to the patient to consider and have them done at pharmacy    2. Hyperlipidemia, unspecified hyperlipidemia " type  Prescription for simvastatin is renewed  - simvastatin (Zocor) 40 mg tablet; Take 1 tablet (40 mg) by mouth once daily in the evening.  Dispense: 90 tablet; Refill: 1    3. Vitamin D deficiency  Vitamin D level in March was 50.    4. PAF (paroxysmal atrial fibrillation) / SVT  Patient has no cardiac symptoms today.  He is on metoprolol is being followed by his cardiologist  - metoprolol succinate XL (Toprol-XL) 25 mg 24 hr tablet; Take 1 tablet (25 mg) by mouth 2 times a day.  Dispense: 180 tablet; Refill: 1    5. Benign prostatic hyperplasia without lower urinary tract symptoms  Patient is on Flomax is being followed by his urologist for which he has a order for PSA.    6. Obesity, class 1  Diet excise activity reviewed with the patient.  He will be seen in office in 4 months                         [1]   Past Surgical History:  Procedure Laterality Date    FINGER SURGERY Left     left  index finger    OTHER SURGICAL HISTORY  10/25/2021    Foot surgery   [2]   Family History  Problem Relation Name Age of Onset    Atrial fibrillation Mother      Hypertension Mother      Other (stroke syndrome) Mother      Atrial fibrillation Father      Lung cancer Father      Atrial fibrillation Sister      Atrial fibrillation Brother      Dementia Maternal Grandmother     [3]   Current Outpatient Medications on File Prior to Visit   Medication Sig Dispense Refill    albuterol 90 mcg/actuation inhaler Inhale 2 puffs every 4 hours if needed.      aspirin 81 mg EC tablet Take 1 tablet (81 mg) by mouth once daily.      cholecalciferol (Vitamin D-3) 50 MCG (2000 UT) tablet Take 2 tablets (100 mcg) by mouth once daily.      multivitamin with minerals iron-free (Centrum Silver) Take 1 tablet by mouth once daily.      tamsulosin (Flomax) 0.4 mg 24 hr capsule Take 1 capsule (0.4 mg) by mouth once daily.      [DISCONTINUED] metoprolol succinate XL (Toprol-XL) 25 mg 24 hr tablet Take 1 tablet (25 mg) by mouth 2 times a day. 180 tablet  1    [DISCONTINUED] simvastatin (Zocor) 40 mg tablet Take 1 tablet (40 mg) by mouth once daily in the evening. 90 tablet 1     No current facility-administered medications on file prior to visit.

## 2025-07-06 LAB
25(OH)D3+25(OH)D2 SERPL-MCNC: 50 NG/ML (ref 30–100)
ALBUMIN SERPL-MCNC: 4.6 G/DL (ref 3.6–5.1)
ALP SERPL-CCNC: 53 U/L (ref 35–144)
ALT SERPL-CCNC: 28 U/L (ref 9–46)
ANION GAP SERPL CALCULATED.4IONS-SCNC: 10 MMOL/L (CALC) (ref 7–17)
AST SERPL-CCNC: 22 U/L (ref 10–35)
BASOPHILS # BLD AUTO: 38 CELLS/UL (ref 0–200)
BASOPHILS NFR BLD AUTO: 0.6 %
BILIRUB SERPL-MCNC: 0.9 MG/DL (ref 0.2–1.2)
BUN SERPL-MCNC: 23 MG/DL (ref 7–25)
CALCIUM SERPL-MCNC: 10 MG/DL (ref 8.6–10.3)
CHLORIDE SERPL-SCNC: 104 MMOL/L (ref 98–110)
CHOLEST SERPL-MCNC: 150 MG/DL
CHOLEST/HDLC SERPL: 3.1 (CALC)
CO2 SERPL-SCNC: 25 MMOL/L (ref 20–32)
CREAT SERPL-MCNC: 0.99 MG/DL (ref 0.7–1.35)
EGFRCR SERPLBLD CKD-EPI 2021: 86 ML/MIN/1.73M2
EOSINOPHIL # BLD AUTO: 192 CELLS/UL (ref 15–500)
EOSINOPHIL NFR BLD AUTO: 3 %
ERYTHROCYTE [DISTWIDTH] IN BLOOD BY AUTOMATED COUNT: 12.6 % (ref 11–15)
GLUCOSE SERPL-MCNC: 93 MG/DL (ref 65–99)
HCT VFR BLD AUTO: 52.6 % (ref 38.5–50)
HCV AB SERPL QL IA: NORMAL
HDLC SERPL-MCNC: 49 MG/DL
HGB BLD-MCNC: 16.9 G/DL (ref 13.2–17.1)
LDLC SERPL CALC-MCNC: 81 MG/DL (CALC)
LYMPHOCYTES # BLD AUTO: 1734 CELLS/UL (ref 850–3900)
LYMPHOCYTES NFR BLD AUTO: 27.1 %
MCH RBC QN AUTO: 29.6 PG (ref 27–33)
MCHC RBC AUTO-ENTMCNC: 32.1 G/DL (ref 32–36)
MCV RBC AUTO: 92.1 FL (ref 80–100)
MONOCYTES # BLD AUTO: 608 CELLS/UL (ref 200–950)
MONOCYTES NFR BLD AUTO: 9.5 %
NEUTROPHILS # BLD AUTO: 3827 CELLS/UL (ref 1500–7800)
NEUTROPHILS NFR BLD AUTO: 59.8 %
NONHDLC SERPL-MCNC: 101 MG/DL (CALC)
PLATELET # BLD AUTO: 155 THOUSAND/UL (ref 140–400)
PMV BLD REES-ECKER: 11.4 FL (ref 7.5–12.5)
POTASSIUM SERPL-SCNC: 4.3 MMOL/L (ref 3.5–5.3)
PROT SERPL-MCNC: 7.2 G/DL (ref 6.1–8.1)
RBC # BLD AUTO: 5.71 MILLION/UL (ref 4.2–5.8)
SODIUM SERPL-SCNC: 139 MMOL/L (ref 135–146)
TRIGL SERPL-MCNC: 102 MG/DL
WBC # BLD AUTO: 6.4 THOUSAND/UL (ref 3.8–10.8)

## 2025-07-08 ENCOUNTER — TELEPHONE (OUTPATIENT)
Dept: PRIMARY CARE | Facility: CLINIC | Age: 60
End: 2025-07-08
Payer: COMMERCIAL

## 2025-07-08 NOTE — TELEPHONE ENCOUNTER
Left message for patient about results. No answer. ----- Message from Jyotsna Persaud sent at 7/7/2025 11:26 AM EDT -----  Results are acceptable  ----- Message -----  [facility]  Sent: 7/6/2025   1:50 AM EDT  To: Jyotsna Persaud MD

## 2025-08-07 ENCOUNTER — APPOINTMENT (OUTPATIENT)
Dept: CARDIOLOGY | Facility: CLINIC | Age: 60
End: 2025-08-07
Payer: COMMERCIAL

## 2025-08-07 VITALS
WEIGHT: 250.4 LBS | OXYGEN SATURATION: 90 % | HEART RATE: 76 BPM | SYSTOLIC BLOOD PRESSURE: 120 MMHG | HEIGHT: 73 IN | DIASTOLIC BLOOD PRESSURE: 80 MMHG | BODY MASS INDEX: 33.19 KG/M2

## 2025-08-07 DIAGNOSIS — R00.2 PALPITATIONS: ICD-10-CM

## 2025-08-07 DIAGNOSIS — I48.0 PAROXYSMAL ATRIAL FIBRILLATION WITH RVR (MULTI): Primary | ICD-10-CM

## 2025-08-07 DIAGNOSIS — Z79.01 ON CONTINUOUS ORAL ANTICOAGULATION: ICD-10-CM

## 2025-08-07 PROCEDURE — 99214 OFFICE O/P EST MOD 30 MIN: CPT | Performed by: INTERNAL MEDICINE

## 2025-08-07 PROCEDURE — 1036F TOBACCO NON-USER: CPT | Performed by: INTERNAL MEDICINE

## 2025-08-07 PROCEDURE — 3008F BODY MASS INDEX DOCD: CPT | Performed by: INTERNAL MEDICINE

## 2025-08-07 RX ORDER — APIXABAN 5 MG/1
5 TABLET, FILM COATED ORAL 2 TIMES DAILY
COMMUNITY
Start: 2025-07-20 | End: 2025-08-07 | Stop reason: WASHOUT

## 2025-08-07 ASSESSMENT — ENCOUNTER SYMPTOMS
SHORTNESS OF BREATH: 0
PALPITATIONS: 0

## 2025-08-07 NOTE — PROGRESS NOTES
Patient:  Sundar Sood  YOB: 1965  MRN: [id number]       Chief Complaint/Active Symptoms:       Sundar Sood is a 60 y.o. male who returns today for cardiac follow-up. Hx of brief episodes of PAF.     Had an episode of a fib 7/20/25. Taken by squad to [facility]. Given IV cardizem and converted after 9 hours and released. Saw a cardiologist - Jerod Roque in follow-up, started on oral anticoagulation and increased his beta blockers. This was his 3rd episode of a fib in 15 years. Last episode 8 years ago.     No recurrent symptoms since discharge. No early bleeding complications.     Review of Systems   Respiratory:  Negative for shortness of breath.    Cardiovascular:  Negative for chest pain, palpitations and leg swelling.   All other systems reviewed and are negative.      Objective:     Vitals:    08/07/25 1458   BP: 120/80   Pulse: 76   SpO2: 90%       Vitals:    08/07/25 1458   Weight: 114 kg (250 lb 6.4 oz)       Allergies:     Allergies[1]       Medications:     Current Outpatient Medications   Medication Instructions    albuterol 90 mcg/actuation inhaler 2 puffs, Every 4 hours PRN    aspirin 81 mg, Daily    cholecalciferol (VITAMIN D-3) 100 mcg, Daily    Eliquis 5 mg, 2 times daily    metoprolol succinate XL (TOPROL-XL) 25 mg, oral, 2 times daily    multivitamin with minerals iron-free (Centrum Silver) 1 tablet, Daily    simvastatin (ZOCOR) 40 mg, oral, Every evening    tamsulosin (Flomax) 0.4 mg 24 hr capsule 1 capsule, Daily       Physical Examination:   GENERAL:  Well developed, well nourished, in no acute distress.  HEENT: NC AT, anicteric sclera  NECK:   no JVD, no bruit.  CHEST:  Symmetric and nontender.  LUNGS:  Clear to auscultation bilaterally, normal respiratory effort.  HEART: PMI not palpable.  Regular rhythm with a normal S1 and S2 without appreciable S3 or murmur  EXTREMITIES:  Warm with good color, no clubbing or cyanosis.  There is no edema noted.  PERIPHERAL VASCULAR:   "Pulses present and equally palpable; 2+ throughout.  MUSCULOSKELETAL: No significant joint or limb deformity  NEURO/PSYCH:  Alert and oriented times three with approppriate behavior and responses. Normal gait and ambulation  Skin: no rash or lesions on exposed skin or reported.    Lab:     CBC:   Lab Results   Component Value Date    WBC 6.4 07/05/2025    RBC 5.71 07/05/2025    HGB 16.9 07/05/2025    HCT 52.6 (H) 07/05/2025     07/05/2025        CMP:    Lab Results   Component Value Date     07/05/2025    K 4.3 07/05/2025     07/05/2025    CO2 25 07/05/2025    BUN 23 07/05/2025    CREATININE 0.99 07/05/2025    GLUCOSE 93 07/05/2025    CALCIUM 10.0 07/05/2025       Magnesium:    No results found for: \"MG\"    Lipid Profile:    Lab Results   Component Value Date    TRIG 102 07/05/2025    HDL 49 07/05/2025    LDLCALC 81 07/05/2025       TSH:    No results found for: \"TSH\"    BNP:   No results found for: \"BNP\"     PT/INR:    No results found for: \"PROTIME\", \"INR\"    HgBA1c:    No results found for: \"HGBA1C\"    BMP:  Lab Results   Component Value Date     07/05/2025     03/01/2025    K 4.3 07/05/2025    K 4.0 03/01/2025     07/05/2025     03/01/2025    CO2 25 07/05/2025    CO2 27 03/01/2025    BUN 23 07/05/2025    BUN 15 03/01/2025    CREATININE 0.99 07/05/2025    CREATININE 0.87 03/01/2025       Cardiac Enzymes:    No results found for: \"TROPHS\"    Hepatic Function Panel:    Lab Results   Component Value Date    ALKPHOS 53 07/05/2025    ALT 28 07/05/2025    AST 22 07/05/2025    PROT 7.2 07/05/2025    BILITOT 0.9 07/05/2025     Labs reviewed.     Diagnostic Studies:     No new or recent CV testing.     Radiology:     No orders to display     ASSESSMENT     Problem List Items Addressed This Visit       Paroxysmal atrial fibrillation with RVR (Multi) - Primary    Relevant Orders    Transthoracic echo (TTE) complete    CT cardiac scoring wo IV contrast    Referral to Cardiac " Electrophysiology    Follow Up In Cardiology    Palpitations    Relevant Orders    Referral to Cardiac Electrophysiology    Follow Up In Cardiology    On continuous oral anticoagulation       PLAN   1.  Paroxysmal atrial fibrillation.  This is the patient's third ever lifetime episode.  Last episode was 8 years ago.  He has noted on the higher dose of metoprolol he does have a little bit of fatigue, does not have some of the palpitations he had before but his heart rates are also much lower.  For now, because his palpitations before were very benign and description go back down on his metoprolol to 25 mg twice daily.  His [id number] score is 0 so we have also discontinued his oral Eliquis therapy I recommend he remain on aspirin therapy.  I am going to check an echo to look at his left atrial size to make sure there is no LV dysfunction or wall motion abnormalities and I have referred him for a CT cardiac score for risk stratification for coronary artery disease.  If his score is high would recommend a stress test.  My plan is to place him on a pill in the pocket approach with his ability to take an extra metoprolol and oral flecainide if he has an episode of A-fib rather than going to the ER and sitting and waiting for 8 to 9 hours for conversion.  I have referred him to electrophysiology where he can talk to the electrophysiologist about ablation for lone atrial fibrillation.    While he is having this evaluation done he is to contact me if he has any problems or concerns.                     [1]   Allergies  Allergen Reactions    Penicillins Unknown

## 2025-08-07 NOTE — PATIENT INSTRUCTIONS
Resume metoprolol succinate 25 mg twice daily  Stop eliquis  Referral to electrophysiology.     Talk to your PCP about a home screening for sleep apnea.  Get echo  Get CT cardiac score  Consider getting a [employer] smart watch that can detect a fib and tachycardia - and wear, if not consider [employer] (only good for a sustained event)    See me back after testing

## 2025-08-11 ENCOUNTER — APPOINTMENT (OUTPATIENT)
Dept: PRIMARY CARE | Facility: CLINIC | Age: 60
End: 2025-08-11
Payer: COMMERCIAL

## 2025-09-02 ENCOUNTER — HOSPITAL ENCOUNTER (OUTPATIENT)
Dept: CARDIOLOGY | Facility: CLINIC | Age: 60
Discharge: HOME | End: 2025-09-02
Payer: COMMERCIAL

## 2025-09-02 VITALS
BODY MASS INDEX: 33.13 KG/M2 | SYSTOLIC BLOOD PRESSURE: 116 MMHG | WEIGHT: 250 LBS | HEIGHT: 73 IN | DIASTOLIC BLOOD PRESSURE: 74 MMHG

## 2025-09-02 DIAGNOSIS — I48.0 PAROXYSMAL ATRIAL FIBRILLATION WITH RVR (MULTI): ICD-10-CM

## 2025-09-02 PROCEDURE — 93306 TTE W/DOPPLER COMPLETE: CPT | Performed by: INTERNAL MEDICINE

## 2025-09-02 PROCEDURE — 93306 TTE W/DOPPLER COMPLETE: CPT

## 2025-09-05 LAB
AORTIC VALVE MEAN GRADIENT: 3 MMHG
AORTIC VALVE PEAK VELOCITY: 1.18 M/S
AV PEAK GRADIENT: 6 MMHG
AVA (PEAK VEL): 2.45 CM2
AVA (VTI): 2.38 CM2
EJECTION FRACTION APICAL 4 CHAMBER: 65.1
EJECTION FRACTION: 64 %
LEFT ATRIUM VOLUME AREA LENGTH INDEX BSA: 16.5 ML/M2
LEFT VENTRICLE INTERNAL DIMENSION DIASTOLE: 5.61 CM (ref 3.5–6)
LEFT VENTRICULAR OUTFLOW TRACT DIAMETER: 2.16 CM
LV EJECTION FRACTION BIPLANE: 64 %
MITRAL VALVE E/A RATIO: 0.76
MITRAL VALVE E/E' RATIO: 5.77
RIGHT VENTRICLE PEAK SYSTOLIC PRESSURE: 23 MMHG
TRICUSPID ANNULAR PLANE SYSTOLIC EXCURSION: 2.4 CM

## 2025-09-15 ENCOUNTER — APPOINTMENT (OUTPATIENT)
Dept: PRIMARY CARE | Facility: CLINIC | Age: 60
End: 2025-09-15
Payer: COMMERCIAL

## 2025-10-02 ENCOUNTER — APPOINTMENT (OUTPATIENT)
Dept: CARDIOLOGY | Facility: CLINIC | Age: 60
End: 2025-10-02
Payer: COMMERCIAL

## 2026-05-21 ENCOUNTER — APPOINTMENT (OUTPATIENT)
Dept: CARDIOLOGY | Facility: CLINIC | Age: 61
End: 2026-05-21
Payer: COMMERCIAL